# Patient Record
Sex: FEMALE | Race: WHITE | NOT HISPANIC OR LATINO | Employment: FULL TIME | URBAN - METROPOLITAN AREA
[De-identification: names, ages, dates, MRNs, and addresses within clinical notes are randomized per-mention and may not be internally consistent; named-entity substitution may affect disease eponyms.]

---

## 2019-01-28 ENCOUNTER — TRANSCRIBE ORDERS (OUTPATIENT)
Dept: ADMINISTRATIVE | Facility: HOSPITAL | Age: 54
End: 2019-01-28

## 2019-01-28 DIAGNOSIS — Z12.31 ENCOUNTER FOR SCREENING MAMMOGRAM FOR MALIGNANT NEOPLASM OF BREAST: Primary | ICD-10-CM

## 2019-03-04 ENCOUNTER — HOSPITAL ENCOUNTER (OUTPATIENT)
Dept: MAMMOGRAPHY | Facility: HOSPITAL | Age: 54
Discharge: HOME | End: 2019-03-04
Attending: SPECIALIST | Admitting: SPECIALIST

## 2019-03-04 DIAGNOSIS — Z12.31 ENCOUNTER FOR SCREENING MAMMOGRAM FOR MALIGNANT NEOPLASM OF BREAST: ICD-10-CM

## 2019-03-04 PROCEDURE — 77063 BREAST TOMOSYNTHESIS BI: CPT

## 2019-03-04 PROCEDURE — 77067 SCR MAMMO BI INCL CAD: CPT

## 2022-10-08 ENCOUNTER — APPOINTMENT (OUTPATIENT)
Dept: GENERAL RADIOLOGY | Facility: HOSPITAL | Age: 57
End: 2022-10-08

## 2022-10-08 ENCOUNTER — HOSPITAL ENCOUNTER (EMERGENCY)
Facility: HOSPITAL | Age: 57
Discharge: HOME | End: 2022-10-08
Attending: EMERGENCY MEDICINE

## 2022-10-08 VITALS
TEMPERATURE: 97.6 F | OXYGEN SATURATION: 99 % | SYSTOLIC BLOOD PRESSURE: 132 MMHG | DIASTOLIC BLOOD PRESSURE: 108 MMHG | RESPIRATION RATE: 16 BRPM | HEART RATE: 92 BPM

## 2022-10-08 DIAGNOSIS — S93.401A SPRAIN OF RIGHT ANKLE, UNSPECIFIED LIGAMENT, INITIAL ENCOUNTER: ICD-10-CM
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
## 2022-10-08 DIAGNOSIS — S86.911A KNEE STRAIN, RIGHT, INITIAL ENCOUNTER: ICD-10-CM

## 2022-10-08 PROCEDURE — 73610 X-RAY EXAM OF ANKLE: CPT

## 2022-10-08 PROCEDURE — 73590 X-RAY EXAM OF LOWER LEG: CPT

## 2022-10-08 PROCEDURE — 73552 X-RAY EXAM OF FEMUR 2/>: CPT

## 2022-10-08 PROCEDURE — 73560 X-RAY EXAM OF KNEE 1 OR 2: CPT

## 2022-10-08 PROCEDURE — 99283 EMERGENCY DEPT VISIT LOW MDM: CPT

## 2022-10-08 PROCEDURE — 73502 X-RAY EXAM HIP UNI 2-3 VIEWS: CPT

## 2022-10-08 NOTE — ED NOTES
The EMR was down for 4 hours on 10/8/2022.    Carola Montez was responsible for completing the paper charting during this time period.     The following information will remain in the paper chart: nursing notes.    Krystal Durand RN  10/8/2022

## 2022-10-08 NOTE — ED PROVIDER NOTES
EMERGENCY DEPARTMENT ENCOUNTER    CHIEF COMPLAINT  Chief Complaint: Fall  History given by: Patient  History limited by: None  Room Number: 12/12  PMD: Lizz Hallman MD      HPI:  Pt is a 56 y.o. female who presents complaining of right lower extremity pain that began immediately status post fall tonight.  She states that she was transferring from bed to wheelchair when the wheelchair gave way causing her to fall pinning her right lower extremity.  She does complain of discomfort to the majority of the right lower leg from hip to ankle.  She does not bear weight at baseline.  She denies any further trauma.  She denies headache, chest pain, shortness of breath, abdominal pain, or back pain.    Duration: Just prior to ED arrival  Onset: Sudden  Location: Right lower extremity  Radiation: None  Quality: Dull/aching  Intensity/Severity: Moderate  Progression: Unchanging since onset  Associated Symptoms: None  Aggravating Factors: Touch/movement  Alleviating Factors: None  Previous Episodes: None  Treatment before arrival: None    PAST MEDICAL HISTORY  Active Ambulatory Problems     Diagnosis Date Noted   • Left knee injury 08/09/2016   • Fracture, femur, supracondylar (HCC) 09/25/2016   • Knee injury 09/25/2016   • Pain and swelling of left lower leg 10/11/2016     Resolved Ambulatory Problems     Diagnosis Date Noted   • No Resolved Ambulatory Problems     Past Medical History:   Diagnosis Date   • Paraparesis of both lower limbs (HCC)    • Spina bifida of lumbar spine (HCC)    • Tethered cord syndrome (HCC)        PAST SURGICAL HISTORY  Past Surgical History:   Procedure Laterality Date   • BACK SURGERY     • LEG SURGERY     • NECK SURGERY         FAMILY HISTORY  Family History   Problem Relation Age of Onset   • Cancer Mother         breast cancer   • Breast cancer Mother        SOCIAL HISTORY  Social History     Socioeconomic History   • Marital status: Single   Tobacco Use   • Smoking status: Never    • Smokeless tobacco: Never   Substance and Sexual Activity   • Alcohol use: Yes     Comment: occasionally   • Drug use: No   • Sexual activity: Defer       ALLERGIES  Codeine and Sulfa antibiotics    REVIEW OF SYSTEMS  Review of Systems   Constitutional: Negative for fever.   HENT: Negative for sore throat.    Eyes: Negative.    Respiratory: Negative for cough and shortness of breath.    Cardiovascular: Negative for chest pain.   Gastrointestinal: Negative for abdominal pain, diarrhea and vomiting.   Genitourinary: Negative for dysuria.   Musculoskeletal: Negative for neck pain.        Right lower extremity pain   Skin: Negative for rash.   Allergic/Immunologic: Negative.    Neurological: Negative for weakness, numbness and headaches.   Hematological: Negative.    Psychiatric/Behavioral: Negative.    All other systems reviewed and are negative.      PHYSICAL EXAM  ED Triage Vitals [10/08/22 0031]   Temp Heart Rate Resp BP SpO2   97.6 °F (36.4 °C) 92 16 160/80 99 %      Temp src Heart Rate Source Patient Position BP Location FiO2 (%)   -- -- -- -- --       Physical Exam  Vitals and nursing note reviewed.   Constitutional:       General: She is not in acute distress.  HENT:      Head: Normocephalic and atraumatic.   Eyes:      Extraocular Movements: EOM normal.      Pupils: Pupils are equal, round, and reactive to light.   Cardiovascular:      Rate and Rhythm: Normal rate and regular rhythm.      Heart sounds: Normal heart sounds.   Pulmonary:      Effort: Pulmonary effort is normal. No respiratory distress.      Breath sounds: Normal breath sounds.   Abdominal:      Palpations: Abdomen is soft.      Tenderness: There is no abdominal tenderness. There is no guarding or rebound.   Musculoskeletal:         General: No edema. Normal range of motion.      Cervical back: Normal range of motion and neck supple.   Skin:     General: Skin is warm and dry.      Findings: No rash.   Neurological:      Mental Status: She is  alert and oriented to person, place, and time.      Sensory: Sensation is intact.      Motor: Motor strength is normal.   Psychiatric:         Mood and Affect: Mood and affect normal.         LAB RESULTS  Lab Results (last 24 hours)     ** No results found for the last 24 hours. **          I ordered the above labs and reviewed the results    RADIOLOGY  XR Femur 2 View Right   Final Result       As described.       This report was finalized on 10/8/2022 7:15 AM by Dr. Ronaldo Vuong M.D.          XR Knee 1 or 2 View Right   Final Result       As described.       This report was finalized on 10/8/2022 7:15 AM by Dr. Ronaldo Vuong M.D.          XR Hip With or Without Pelvis 2 - 3 View Right   Final Result       As described.       This report was finalized on 10/8/2022 7:15 AM by Dr. Ronaldo Vuong M.D.          XR Tibia Fibula 2 View Right   Final Result       As described.       This report was finalized on 10/8/2022 7:15 AM by Dr. Ronaldo Vuong M.D.          XR Ankle 3+ View Right   Final Result       As described.       This report was finalized on 10/8/2022 7:15 AM by Dr. Ronaldo Vuong M.D.               I ordered the above noted radiological studies. Interpreted by radiologist.  Reviewed by me in PACS.       PROCEDURES  Procedures      PROGRESS AND CONSULTS     The patient was wearing a facemask upon entrance into the room and remained in such throughout their visit.  I was wearing PPE including a facemask, eye protection, as well as gloves at any point entering the room and throughout the visit    0710  On reevaluation, the patient is resting comfortably and without acute complaint.  I did inform her that her x-rays do not show an acute fracture and at this point she will be stable for discharge to home.  The patient is in agreement with that plan and all questions have been answered.    MEDICAL DECISION MAKING  Results were reviewed/discussed with the patient and they were also  made aware of online access. Pt also made aware that some labs, such as cultures, will not be resulted during ER visit and follow up with PMD is necessary.     MDM  Number of Diagnoses or Management Options     Amount and/or Complexity of Data Reviewed  Tests in the radiology section of CPT®: ordered and reviewed  Review and summarize past medical records: yes (There are no previous emergency room records available for review)  Independent visualization of images, tracings, or specimens: yes (No acute fractures seen on x-ray of the right hip/femur/knee/tib-fib/ankle)           DIAGNOSIS  Final diagnoses:   Fall, initial encounter   Knee strain, right, initial encounter   Sprain of right ankle, unspecified ligament, initial encounter       DISPOSITION  DISCHARGE    Patient discharged in stable condition.    Reviewed implications of results, diagnosis, meds, responsibility to follow up, warning signs and symptoms of possible worsening, potential complications and reasons to return to ER.    Patient/Family voiced understanding of above instructions.    Discussed plan for discharge, as there is no emergent indication for admission. Patient referred to primary care provider for BP management due to today's BP. Pt/family is agreeable and understands need for follow up and repeat testing.  Pt is aware that discharge does not mean that nothing is wrong but it indicates no emergency is present that requires admission and they must continue care with follow-up as given below or physician of their choice.     FOLLOW-UP  Lizz Hallman MD  [address] [place] [id number] [phone]    Schedule an appointment as soon as possible for a visit            Medication List      No changes were made to your prescriptions during this visit.             Latest Documented Vital Signs:  As of 07:32 EDT  BP- 160/80 HR- 92 Temp- 97.6 °F (36.4 °C) O2 sat- 99%         Stef Park MD  10/08/22  0710

## 2022-10-08 NOTE — ED TRIAGE NOTES
"Pt to ED via ambulance from home. Pt reports she had a fall while attempting to transfer to her wheelchair. Pt reports hearing a \"pop\" but she is not sure what popped, reports 5/10 pain in R thigh. Pt denies hitting her head. Pt denies blood thinners.     Pt in mask, this RN in PPE.  "